# Patient Record
Sex: MALE | Race: BLACK OR AFRICAN AMERICAN | NOT HISPANIC OR LATINO | URBAN - METROPOLITAN AREA
[De-identification: names, ages, dates, MRNs, and addresses within clinical notes are randomized per-mention and may not be internally consistent; named-entity substitution may affect disease eponyms.]

---

## 2021-07-19 ENCOUNTER — OFFICE VISIT (OUTPATIENT)
Dept: PHYSICAL THERAPY | Facility: REHABILITATION | Age: 21
End: 2021-07-19
Payer: COMMERCIAL

## 2021-07-19 DIAGNOSIS — S76.312A HAMSTRING STRAIN, LEFT, INITIAL ENCOUNTER: Primary | ICD-10-CM

## 2021-07-19 PROCEDURE — 97161 PT EVAL LOW COMPLEX 20 MIN: CPT | Performed by: PHYSICAL THERAPIST

## 2021-07-19 NOTE — PROGRESS NOTES
PT Evaluation     Today's date: 2021  Patient name: Terrell Carr  : 2000  MRN: [id number]  Referring provider: No ref  provider found  Dx:   Encounter Diagnosis     ICD-10-CM    1  Hamstring strain, left, initial encounter  S76 312A                   Assessment  Assessment details: Pt is a pleasant 22 y o  male presenting to outpatient physical therapy Direct [facility] with Hamstring strain, left, initial encounter  (primary encounter diagnosis)  Pt presents with pain, decreased range of motion, decreased strength, and decreased tolerance to activity  Displays movement impairment diagnosis of L hamstring hypomobility dysfunction  Pt is a good candidate for outpatient physical therapy and would benefit from skilled physical therapy to address limitations and to achieve goals  Thank you for this referral    Impairments: abnormal coordination, abnormal or restricted ROM, activity intolerance, impaired physical strength and pain with function  Understanding of Dx/Px/POC: good   Prognosis: good    Goals  ST  Patient will report 25% decrease in pain in 4 weeks  - MET  2  Patient will demonstrate 25% improvement in ROM in 4 weeks  - MET  3  Patient will demonstrate 1/2 grade improvement in strength in 4 weeks  - MET    LT  Patient will be able to perform IADLS without restriction or pain by discharge  2  Patient will be independent in HEP by discharge  3  Patient will be able to return to recreational/work duties without restriction or pain by discharge        Plan  Patient would benefit from: PT eval and skilled PT  Planned modality interventions: cryotherapy and thermotherapy: hydrocollator packs  Planned therapy interventions: IADL retraining, body mechanics training, flexibility, functional ROM exercises, home exercise program, neuromuscular re-education, manual therapy, postural training, strengthening, stretching, therapeutic activities, therapeutic exercise and joint mobilization  Frequency: 2x week  Duration in visits: 8  Duration in weeks: 4  Treatment plan discussed with: patient        Subjective Evaluation    History of Present Illness  Mechanism of injury: 21  Pt comes to therapy reporting 2-week history of LLE injury, while at participating in some football drills  States he was in a full sprint while covering a wide  when he felt a pop in L hamstring region, resulting in immediate pain and difficulty with weight-bearing  Since this time, he states he has been applying ice and performing hamstring stretching, noting approximately 25% improvement  Pt denies low back, radiating symptoms, calf, knee, or contralateral symptoms  Denies paresthesias  LOC: prox L hamstring/musc-tendonous junction; described as pulling/soreness  AGGS: squat, eccentric hamstring movements  Pain  Current pain ratin    Patient Goals  Patient goals for therapy: decreased pain, increased motion and return to sport/leisure activities          Objective     Palpation     Additional Palpation Details  21  TTP in proximal 1/3 L hamstring, at musc/tendonous junction     Lumbar Screen  Lumbar range of motion within normal limits  Active Range of Motion     Right Hip   Normal active range of motion    Additional Active Range of Motion Details  21  Discomfort noted with prone active knee flex    Passive Range of Motion   Left Hip   Flexion: [place] [place]  External rotation (90/90): [place]  Internal rotation (90/90):  WFL    Right Hip   Normal passive range of motion    Strength/Myotome Testing     Left Hip   Planes of Motion   Flexion: 3-  Extension: 3+  Abduction: 4  External rotation: 4+  Internal rotation: 4+    Right Hip   Normal muscle strength    Tests     Additional Tests Details  21  SLR right - 82*, left - 76*  Squat - discomfort noted at initiation of squat  Deadlift - discomfort noted at initiation of deadlift, with less discomfort than squat  Lunge - no discomfort  Less discomfort noted with squat when manual pressure applied to hamstrings distal to area of soreness       Flowsheet Rows      Most Recent Value   PT/OT G-Codes   Current Score  52   Projected Score  81             Precautions: n/a    Daily Treatment Diary    Date 7/19            FOTO IE            Re-Eval IE               Manuals    MFD L hamstring DULCE MARIA            IASTM L hamstring DULCE MARIA            Active release nv                         Neuro Re-Ed                                                                                                Ther Ex    Squats             Leg press             RDLs                                                                              Ther Activity    bike                          Gait Training                              Modalities

## 2021-07-27 ENCOUNTER — OFFICE VISIT (OUTPATIENT)
Dept: PHYSICAL THERAPY | Facility: REHABILITATION | Age: 21
End: 2021-07-27
Payer: COMMERCIAL

## 2021-07-27 DIAGNOSIS — S76.312A HAMSTRING STRAIN, LEFT, INITIAL ENCOUNTER: Primary | ICD-10-CM

## 2021-07-27 PROCEDURE — 97140 MANUAL THERAPY 1/> REGIONS: CPT | Performed by: PHYSICAL THERAPIST

## 2021-07-27 PROCEDURE — 97530 THERAPEUTIC ACTIVITIES: CPT | Performed by: PHYSICAL THERAPIST

## 2021-07-27 PROCEDURE — 97110 THERAPEUTIC EXERCISES: CPT | Performed by: PHYSICAL THERAPIST

## 2021-07-27 NOTE — PROGRESS NOTES
Daily Note     Today's date: 2021  Patient name: Valentín Humphries  : 2000  MRN: [id number]  Referring provider: No ref  provider found  Dx:   Encounter Diagnosis     ICD-10-CM    1  Hamstring strain, left, initial encounter  S76 149A                   Subjective: Pt comes to therapy reporting decreased hamstring tightness and pain  States he found the chair hamstring stretches in standing to be most beneficial        Objective: See treatment diary below      Assessment: Tolerated treatment well  Demonstrated near full squat with minor discomfort reported at end of movement, however, able to demonstrate full squat with no reported symptoms post-manuals  Initiated eccentrically focussed program today with good tolerance and no reported pain throughout session  Also able to initiate agility exercises today with no concerns or discomfort  Finished with static chair hamstring stretches  Patient demonstrated fatigue post treatment, exhibited good technique with therapeutic exercises and would benefit from continued PT      Plan: Progress treatment as tolerated         Precautions: n/a    Daily Treatment Diary    Date            FOTO IE            Re-Eval IE               Manuals    MFD L hamstring DULCE MARIA العراقي c squat           IASTM L hamstring DULCE MARIA العراقي           Active release nv                         Neuro Re-Ed     Wall runner  pball   2x8           Bridge ball curl  nv           [place] HS curl  pball assist nv                                                               Ther Ex                 Leg press  198# x10  264# x10           RDLs  30#KB  2x10  double           Leg curl ecc  55# 2x10                        Bridge ecc slide on slider  Double x10  Single x10                                     Ther Activity    bike  5' L5           Agility ladder - fwd, lat, diag, hop in/out, side in/out  x2 ea                        Gait Training                              Modalities

## 2021-07-29 ENCOUNTER — OFFICE VISIT (OUTPATIENT)
Dept: PHYSICAL THERAPY | Facility: REHABILITATION | Age: 21
End: 2021-07-29
Payer: COMMERCIAL

## 2021-07-29 DIAGNOSIS — S76.312A HAMSTRING STRAIN, LEFT, INITIAL ENCOUNTER: Primary | ICD-10-CM

## 2021-07-29 PROCEDURE — 97140 MANUAL THERAPY 1/> REGIONS: CPT | Performed by: PHYSICAL THERAPIST

## 2021-07-29 PROCEDURE — 97530 THERAPEUTIC ACTIVITIES: CPT | Performed by: PHYSICAL THERAPIST

## 2021-07-29 PROCEDURE — 97110 THERAPEUTIC EXERCISES: CPT | Performed by: PHYSICAL THERAPIST

## 2021-07-29 NOTE — PROGRESS NOTES
Daily Note     Today's date: 2021  Patient name: Cate Madera  : 2000  MRN: [id number]  Referring provider: No ref  provider found  Dx:   Encounter Diagnosis     ICD-10-CM    1  Hamstring strain, left, initial encounter  S76 312A                   Subjective: Pt comes to therapy reporting soreness in hamstrings since last session, however, notes soreness is throughout hamstring region, not on lateral aspect as initially reported  Objective: See treatment diary below      Assessment: Tolerated treatment well  Patient demonstrated fatigue post treatment, exhibited good technique with therapeutic exercises and would benefit from continued PT  Demonstrated good control and awareness throughout session  Held slide board bridges, as these were attributed to high levels of soreness  Tolerated remainder of program well and without complaints  Challenged with knee curl machine eccentrics and nordic hamstring curls  Advised patient to perform gradual return to running program and sprinting at 50%, with gradual increase by 5-10% per day  Plan: Progress treatment as tolerated         Precautions: n/a    Daily Treatment Diary    Date           FOTO IE            Re-Eval IE               Manuals    MFD L hamstring DULCE MARIA DULCE MARIA c squat DULCE MARIA c active HS str          IASTM L hamstring DULCE MARIA DULCE MARIA DULCE MARIA          Active release nv                         Neuro Re-Ed     Wall runner  pball   2x8 pball   2x8          Bridge ball curl  nv           [place] HS curl  pball assist nv pball x5  s ball x8          Lunge c sliders - back, side, cross   x5 ea                                                 Ther Ex                 Leg press  198# x10  264# x10 242# x10  286# x10  308# 3x10          RDLs  30#KB  2x10  double 30#KB  2x10  double          Leg curl ecc  55# 2x10 55# 2x10                       Bridge ecc slide on slider  Double x10  Single x10 held                                    Ther Activity    bike 5' L5 5' L5          Agility ladder - fwd, lat, diag, hop in/out, side in/out  x2 ea x2 ea                       Gait Training                              Modalities

## 2021-08-02 ENCOUNTER — OFFICE VISIT (OUTPATIENT)
Dept: PHYSICAL THERAPY | Facility: REHABILITATION | Age: 21
End: 2021-08-02
Payer: COMMERCIAL

## 2021-08-02 DIAGNOSIS — S76.312A HAMSTRING STRAIN, LEFT, INITIAL ENCOUNTER: Primary | ICD-10-CM

## 2021-08-02 PROCEDURE — 97110 THERAPEUTIC EXERCISES: CPT | Performed by: PHYSICAL THERAPIST

## 2021-08-02 PROCEDURE — 97140 MANUAL THERAPY 1/> REGIONS: CPT | Performed by: PHYSICAL THERAPIST

## 2021-08-02 PROCEDURE — 97530 THERAPEUTIC ACTIVITIES: CPT | Performed by: PHYSICAL THERAPIST

## 2021-08-02 NOTE — PROGRESS NOTES
Daily Note     Today's date: 2021  Patient name: Lieutenant Fuentes  : 2000  MRN: [id number]  Referring provider: No ref  provider found  Dx:   Encounter Diagnosis     ICD-10-CM    1  Hamstring strain, left, initial encounter  S76 312A                   Subjective: Pt reports he is overall feeling better, did some light jogging and did have some mild mid-hamstring pain  Pt returns to campus next week for pre-season training  Objective: See treatment diary below  Pt arrived late to session, therefore, some exercises were held due to time constraints  Assessment: Pt with good tolerance to progression of program with appropriate challenge and fatigue, no increase in pain  Pt with most challenge with single leg RDLs and eccentric hamstring curls  Moderate myofascial restriction through central midportion of hamstrings, minimal tenderness  Will resume and progress exercises as able at next session  Pt will benefit from continued skilled PT intervention in order to address their remaining limitations and to restore maximal function  Plan: Progress treatment as tolerated         Precautions: n/a    Daily Treatment Diary    Date          FOTO IE            Re-Eval IE               Manuals    MFD L hamstring DULCE MARIA DULCE MARIA c squat DULCE MARIA c active HS str MD         IASTM L hamstring Mahnaz Morrison MD         Active release nv                         Neuro Re-Ed     Wall runner  pball   2x8 pball   2x8          Bridge ball curl  nv           [place] HS curl  pball assist nv pball x5  s ball x8          Lunge c sliders - back, side, cross   x5 ea                                                 Ther Ex                 Leg press  198# x10  264# x10 242# x10  286# x10  308# 3x10 242# x10  286# x10  308# 3x10         RDLs  30#KB  2x10  double 30#KB  2x10  double BLE  30#  1x10  LLE  15#  2x10         Leg curl ecc  55# 2x10 55# 2x10 55#  1x10  66#  2x10                      Bridge ecc slide on slider  Double x10  Single x10 held                                    Ther Activity    bike  5' L5 5' L5 L5x5'         Agility ladder - fwd, lat, diag, hop in/out, side in/out  x2 ea x2 ea                       Gait Training                              Modalities

## 2021-08-04 ENCOUNTER — OFFICE VISIT (OUTPATIENT)
Dept: PHYSICAL THERAPY | Facility: REHABILITATION | Age: 21
End: 2021-08-04
Payer: COMMERCIAL

## 2021-08-04 DIAGNOSIS — S76.312A HAMSTRING STRAIN, LEFT, INITIAL ENCOUNTER: Primary | ICD-10-CM

## 2021-08-04 PROCEDURE — 97140 MANUAL THERAPY 1/> REGIONS: CPT | Performed by: PHYSICAL THERAPIST

## 2021-08-04 PROCEDURE — 97110 THERAPEUTIC EXERCISES: CPT | Performed by: PHYSICAL THERAPIST

## 2021-08-04 PROCEDURE — 97530 THERAPEUTIC ACTIVITIES: CPT | Performed by: PHYSICAL THERAPIST

## 2021-08-04 NOTE — PROGRESS NOTES
Daily Note     Today's date: 2021  Patient name: Christiano Ortiz  : 2000  MRN: [id number]  Referring provider: Shandra Yoon, PT  Dx:   Encounter Diagnosis     ICD-10-CM    1  Hamstring strain, left, initial encounter  S76 378A                   Subjective: Pt reports he is overall feeling better, did some foot work drill and jogging and felt really good without any pain  Pt returns to campus Monday for pre-season training  Objective: See treatment diary below  Moderate myofascial restriction through central midportion of hamstrings, minimal tenderness  Will  progress exercises as able at next session  Pt will benefit from continued skilled PT intervention in order to address their remaining limitations and to restore maximal function  Plan: Progress treatment as tolerated         Precautions: n/a    Daily Treatment Diary    Date         FOTO IE            Re-Eval IE               Manuals    MFD L hamstring DULCE MARIA DULCE MARIA c squat DULCE MARIA c active HS str MD MICHAELS        IASTM L hamstring Amor Lancaster MD, MD        Active release nv                         Neuro Re-Ed     Wall runner  pball   2x8 pball   2x8          Bridge ball curl  nv           [place] HS curl  pball assist nv pball x5  s ball x8  Pball  3x10        Lunge c sliders - back, side, cross   x5 ea  3 way  x10 ea                                               Ther Ex                 Leg press  198# x10  264# x10 242# x10  286# x10  308# 3x10 242# x10  286# x10  308# 3x10 286# x10  308# 3x10        RDLs  30#KB  2x10  double 30#KB  2x10  double BLE  30#  1x10  LLE  15#  2x10 BLE  30#  1x10  LLE  30#  2x10        Leg curl ecc  55# 2x10 55# 2x10 55#  1x10  66#  2x10 66#  3x10                     Bridge ecc slide on slider  Double x10  Single x10 held                                    Ther Activity    bike  5' L5 5' L5 L5x5'         Agility ladder - fwd, lat, diag, hop in/out, side in/out  x2 ea x2 ea          Elliptical     L5x5' Gait Training                              Modalities

## 2021-08-06 ENCOUNTER — OFFICE VISIT (OUTPATIENT)
Dept: PHYSICAL THERAPY | Facility: REHABILITATION | Age: 21
End: 2021-08-06
Payer: COMMERCIAL

## 2021-08-06 DIAGNOSIS — S76.312A HAMSTRING STRAIN, LEFT, INITIAL ENCOUNTER: Primary | ICD-10-CM

## 2021-08-06 PROCEDURE — 97112 NEUROMUSCULAR REEDUCATION: CPT | Performed by: PHYSICAL THERAPIST

## 2021-08-06 PROCEDURE — 97110 THERAPEUTIC EXERCISES: CPT | Performed by: PHYSICAL THERAPIST

## 2021-08-06 PROCEDURE — 97140 MANUAL THERAPY 1/> REGIONS: CPT | Performed by: PHYSICAL THERAPIST

## 2021-08-06 NOTE — PROGRESS NOTES
Daily Note     Today's date: 2021  Patient name: Ruddy Olivo  : 2000  MRN: [id number]  Referring provider: No ref  provider found  Dx:   Encounter Diagnosis     ICD-10-CM    1  Hamstring strain, left, initial encounter  S76 312A                   Subjective: Pt reports he had some left lateral hip soreness after doing some drills and jogging yesterday  Objective: See treatment diary below  Assessment: Pt with good response to addition of dynamic stretching and hip flexor stretching  Held hamstring strengthening exercises today due to continued muscle soreness following his workout yesterday  Pt continues with moderate myofascial restriction through proximal central midportion of hamstrings with minimal to moderate tenderness  Pt will be transitioning care to athletic training staff when he returns to campus on Monday  Plan: Discharge PT  Pt will transition care to athletic training staff upon return to campus on 21       Precautions: n/a    Daily Treatment Diary    Date         FOTO IE            Re-Eval IE               Manuals    MFD L hamstring DULCE MARIA DULCE MARIA c squat DULCE MARIA c active HS str MD MICHAELS        IASTM L hamstring Danette Moe MD, MD        Active release nv                         Neuro Re-Ed     Wall runner  pball   2x8 pball   2x8          Bridge ball curl  nv           [place] HS curl  pball assist nv pball x5  s ball x8  Pball  3x10        Lunge c sliders - back, side, cross   x5 ea  3 way  x10 ea                                               Ther Ex                 Leg press  198# x10  264# x10 242# x10  286# x10  308# 3x10 242# x10  286# x10  308# 3x10 286# x10  308# 3x10        RDLs  30#KB  2x10  double 30#KB  2x10  double BLE  30#  1x10  LLE  15#  2x10 BLE  30#  1x10  LLE  30#  2x10        Leg curl ecc  55# 2x10 55# 2x10 55#  1x10  66#  2x10 66#  3x10                     Bridge ecc slide on slider  Double x10  Single x10 held Ther Activity    bike  5' L5 5' L5 L5x5'         Agility ladder - fwd, lat, diag, hop in/out, side in/out  x2 ea x2 ea          Elliptical     L5x5'        Gait Training                              Modalities

## 2022-12-15 ENCOUNTER — OFFICE VISIT (OUTPATIENT)
Dept: PHYSICAL THERAPY | Facility: REHABILITATION | Age: 22
End: 2022-12-15

## 2022-12-15 DIAGNOSIS — S76.012A STRAIN OF LEFT HIP ADDUCTOR MUSCLE, INITIAL ENCOUNTER: Primary | ICD-10-CM

## 2022-12-15 NOTE — PROGRESS NOTES
PT Evaluation     Today's date: 12/15/2022  Patient name: Komal Reis  : 2000  MRN: [id number]  Referring provider: Gabino Roberts PT  Dx:   Encounter Diagnosis     ICD-10-CM    1  Strain of left hip adductor muscle, initial encounter  S76 012A                      Assessment  Assessment details: Pt is a pleasant 25 y o  male presenting to outpatient physical therapy with left hip adductor strain  Pt presents with pain, decreased range of motion, decreased strength, and decreased tolerance to activity  Pt is a good candidate for outpatient physical therapy and would benefit from skilled physical therapy to address limitations and to achieve goals  Thank you for this referral    Impairments: abnormal coordination, abnormal or restricted ROM, activity intolerance, impaired physical strength and pain with function  Understanding of Dx/Px/POC: good   Prognosis: good    Goals  ST  Patient will report 50% decrease in pain with agility drills in 4 weeks  2  Patient will report ability to return to playing a full game in 4 weeks  3  Patient will demonstrate 1/2 grade improvement in strength in 4 weeks  LT  Patient will be able to perform IADLS without restriction or pain by discharge  2  Patient will be independent in HEP by discharge  3  Patient will be able to return to recreational/work duties without restriction or pain by discharge        Plan  Patient would benefit from: PT eval and skilled PT  Planned modality interventions: cryotherapy and thermotherapy: hydrocollator packs  Planned therapy interventions: IADL retraining, body mechanics training, flexibility, functional ROM exercises, home exercise program, neuromuscular re-education, manual therapy, postural training, strengthening, stretching, therapeutic activities, therapeutic exercise and joint mobilization  Frequency: 3x week  Duration in visits: 20  Duration in weeks: 12  Treatment plan discussed with: patient        Subjective Evaluation    History of Present Illness  Mechanism of injury: 12/15/22  Pt comes to therapy reporting approx 1+ month ago he sustained an injury to his left adductors, noting MRI confirmed complete tear of the muscle  Patient is a collegiate football player, 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  Reports he then restrained the area playing a game a few weeks later  States he had been training with the staff at school, however, is now at home on break  States he had been doing mobility, strengthening, and agility exercises  Reports he has not discomfort performing ADLS, however, still limited playing football  States that in one month he is scheduled to go to [facility] to play in an [employer] game  Therefore, his goals are to be able to play in this game     Pain  Current pain ratin  At worst pain ratin    Patient Goals  Patient goals for therapy: decreased pain, increased motion, increased strength and return to sport/leisure activities          Objective     Passive Range of Motion   Left Hip   Abduction: with pain    Additional Passive Range of Motion Details  12/15/22  Discomfort the hip adductors with passive left hip abduction     Strength/Myotome Testing     Left Hip   Planes of Motion   Flexion: 4  Extension: 4  Abduction: 4-  Adduction: 3-  External rotation: 3+  Internal rotation: 4-    Left Knee   Flexion: 4+  Extension: 4+             Precautions: n/a    Daily Treatment Diary    Date 12/15            FOTO IE            Re-Eval IE               Manuals    MFD left add DULCE MARIA            IASTM left add DULCE MARIA            L hip mobs - lat                          Neuro Re-Ed                                                                                                Ther Ex    Blaze pods shuffle             Agility ladder - fwd, lat, diag             Wall hip abd iso              Lunge sliders - fwd, back, lat             SL sit to stand                          Eccentric bridge slide             Hip add iso bridge 2x10            [place]'s              Hip add stretch 10"x10                         Ther Activity    Elliptical                           Gait Training                              Modalities

## 2022-12-19 ENCOUNTER — OFFICE VISIT (OUTPATIENT)
Dept: PHYSICAL THERAPY | Facility: REHABILITATION | Age: 22
End: 2022-12-19

## 2022-12-19 DIAGNOSIS — S76.012A STRAIN OF LEFT HIP ADDUCTOR MUSCLE, INITIAL ENCOUNTER: Primary | ICD-10-CM

## 2022-12-19 NOTE — PROGRESS NOTES
Daily Note     Today's date: 2022  Patient name: Yoon Nogueira  : 2000  MRN: [id number]  Referring provider: Liz Bermeo, PT  Dx:   Encounter Diagnosis     ICD-10-CM    1  Strain of left hip adductor muscle, initial encounter  S76 012A                      Subjective: Pt comes to therapy denying pain or discomfort  Denies discomfort following last treatment session  Objective: See treatment diary below      Assessment: Tolerated treatment well  Soreness and areas of restrictions palpable in L adductors with MFD and IASTM  Challenged with eccentric bridges and lunge slides  Patient demonstrated fatigue post treatment, exhibited good technique with therapeutic exercises and would benefit from continued PT      Plan: Progress treatment as tolerated         Precautions: n/a    Daily Treatment Diary    Date 12/15 12/19           FOTO IE            Re-Eval IE               Manuals    MFD left add Ant Necessary           IASTM left add DULCE MARIA DULCE MARIA           L hip mobs - lat                          Neuro Re-Ed                                                                                                Ther Ex    Blaze pods shuffle  45"x2 (12, 14)           Agility ladder - fwd, lat, diag  nv           Wall hip abd iso   5"x10 b/l           Lunge sliders - fwd, back, lat  x10 ea b/l           SL sit to stand  nv                        Eccentric bridge slide  1x10 DL  1x10 SL           Hip add iso bridge 2x10            [place]'s   3x10 R stab           Hip add stretch 10"x10 20"x5                        Ther Activity    Elliptical   10 min                        Gait Training                              Modalities

## 2022-12-21 ENCOUNTER — OFFICE VISIT (OUTPATIENT)
Dept: PHYSICAL THERAPY | Facility: REHABILITATION | Age: 22
End: 2022-12-21

## 2022-12-21 DIAGNOSIS — S76.012A STRAIN OF LEFT HIP ADDUCTOR MUSCLE, INITIAL ENCOUNTER: Primary | ICD-10-CM

## 2022-12-21 NOTE — PROGRESS NOTES
Daily Note     Today's date: 2022  Patient name: Marivel York  : 2000  MRN: [id number]  Referring provider: Franklin Infante, PT  Dx:   Encounter Diagnosis     ICD-10-CM    1  Strain of left hip adductor muscle, initial encounter  S76 012A                      Subjective: Pt comes to therapy denying adverse reactions following last therapy session, noting some soreness yesterday  Notes he can feel a spot along the proximal adductors that is tight  Objective: See treatment diary below      Assessment: Tolerated treatment well  Soreness noted with MFD to proximal adductor  Patient demonstrated fatigue post treatment, exhibited good technique with therapeutic exercises and would benefit from continued PT      Plan: Progress treatment as tolerated         Precautions: n/a    Daily Treatment Diary    Date 12/15 12/19 12/21          FOTO IE            Re-Eval IE               Manuals    MFD left add DULCE MARIA DULCE MARIA DULCE MARIA          IASTM left add DULCE MARIA DULCE MARIA DULCE MARIA          L hip mobs - lat                          Neuro Re-Ed                                                                                                Ther Ex    Blaze pods shuffle  45"x2 (12, 14) 45"x2 (14, 12)          Agility ladder - fwd, lat, diag  nv           Wall hip abd iso   5"x10 b/l 5" 2x10 b/l          Lunge sliders - fwd, back, lat  x10 ea b/l x10 ea b/l          SL sit to stand  nv                        Eccentric bridge slide  1x10 DL  1x10 SL 2x10 SL          Hip add iso bridge 2x10            [place]'s   3x10 R stab 3x10 R stab          Hip add stretch 10"x10 20"x5 PRN                       Ther Activity    Elliptical   10 min 10 min L8                       Gait Training                              Modalities

## 2022-12-23 ENCOUNTER — OFFICE VISIT (OUTPATIENT)
Dept: PHYSICAL THERAPY | Facility: REHABILITATION | Age: 22
End: 2022-12-23

## 2022-12-23 DIAGNOSIS — S76.012A STRAIN OF LEFT HIP ADDUCTOR MUSCLE, INITIAL ENCOUNTER: Primary | ICD-10-CM

## 2022-12-23 NOTE — PROGRESS NOTES
Daily Note     Today's date: 2022  Patient name: Jeni Ratliff  : 2000  MRN: [id number]  Referring provider: Karlee Smith, PT  Dx:   Encounter Diagnosis     ICD-10-CM    1  Strain of left hip adductor muscle, initial encounter  S76 012A                      Subjective: Pt reports he had less soreness in L adductors following last session compared to prior session  Objective: See treatment diary below      Assessment: Tolerated treatment well  Good challenge reported with program  Patient demonstrated fatigue post treatment, exhibited good technique with therapeutic exercises and would benefit from continued PT      Plan: Progress treatment as tolerated         Precautions: n/a    Daily Treatment Diary    Date 12/15 12/19 12/21 12/23         FOTO IE            Re-Eval IE               Manuals    MFD left add Sueanne Gift DULCE MARIA DULCE MARIA         IASTM left add DULCE MARIA DULCE MARIA DULCE MARIA DULCE MARIA         L hip mobs - lat                          Neuro Re-Ed     Plank sliders - hip abd c TB for add    Purple 2x10                                                                                       Ther Ex    Blaze pods shuffle  45"x2 (12, 14) 45"x2 (14, 12) 45"x2 (14, 14)         Agility ladder - fwd, lat, diag  nv           Wall hip abd iso   5"x10 b/l 5" 2x10 b/l Runner nv         Lunge sliders - fwd, back, lat  x10 ea b/l x10 ea b/l x10 ea b/l                      Eccentric bridge slide  1x10 DL  1x10 SL 2x10 SL 2x10 SL c add iso         Bridges c hip add iso c LE ext    nv         Hip add iso bridge 2x10            [place]'s   3x10 R stab, L add 3x10 R stab, L add 2x10 ea R stab, L add, flex         Plank hip abd sliders c TB add    Purple 2x10         Hip add stretch 10"x10 20"x5 PRN          Leg press c add    nv                                   Ther Activity    Elliptical   10 min 10 min L8 10 min L8                      Gait Training                              Modalities

## 2022-12-27 ENCOUNTER — APPOINTMENT (OUTPATIENT)
Dept: PHYSICAL THERAPY | Facility: REHABILITATION | Age: 22
End: 2022-12-27

## 2022-12-29 ENCOUNTER — APPOINTMENT (OUTPATIENT)
Dept: PHYSICAL THERAPY | Facility: REHABILITATION | Age: 22
End: 2022-12-29

## 2022-12-30 ENCOUNTER — OFFICE VISIT (OUTPATIENT)
Dept: PHYSICAL THERAPY | Facility: REHABILITATION | Age: 22
End: 2022-12-30

## 2022-12-30 DIAGNOSIS — S76.012A STRAIN OF LEFT HIP ADDUCTOR MUSCLE, INITIAL ENCOUNTER: Primary | ICD-10-CM

## 2022-12-30 NOTE — PROGRESS NOTES
Daily Note     Today's date: 2022  Patient name: Yoon Nogueira  : 2000  MRN: [id number]  Referring provider: Liz Bermeo, PT  Dx:   Encounter Diagnosis     ICD-10-CM    1  Strain of left hip adductor muscle, initial encounter  S76 012A                      Subjective: pt reports with c/o pain in groin region due to slipping while performing ladder drills two days  He currently denied pain prior to beginning today  He noted stretching sensation only with stepping forward and to the right  Objective: See treatment diary below      Assessment: Tolerated treatment well and without complaints  Good response with manuals and exercises with no adverse reactions  He noted slight pulling/discomfort in intermittent groin pain with exercises, but diminished upon completion  Patient demonstrated fatigue post treatment, exhibited good technique with therapeutic exercises and would benefit from continued PT      Plan: Continue per plan of care  Progress treatment as tolerated         Precautions: n/a    Daily Treatment Diary    Date 12/15 12/19 12/21 12/23 12/30        FOTO IE    nv        Re-Eval IE               Manuals    MFD left add DULCE MARIA DULCE MARIA DULCE MARIA DULCE MARIA TE        IASTM left add DULCE MARIA DULCE MARIA DULCE MARIA DULCE MARIA TE        L hip mobs - lat                          Neuro Re-Ed     Plank sliders - hip abd c TB for add    Purple 2x10 Purple 2x10                                                                                      Ther Ex    Blaze pods shuffle  45"x2 (12, 14) 45"x2 (14, 12) 45"x2 (14, 14) 45"x2  (14, 17)          Agility ladder - fwd, lat, diag  nv           Wall hip abd iso   5"x10 b/l 5" 2x10 b/l Runner nv nv        Lunge sliders - fwd, back, lat  x10 ea b/l x10 ea b/l x10 ea b/l x10 ea b/l                     Eccentric bridge slide  1x10 DL  1x10 SL 2x10 SL 2x10 SL c add iso 2x10 SL c add iso        Bridges c hip add iso c LE ext    nv         Hip add iso bridge 2x10            [place]'s   3x10 R stab, L add 3x10 R stab, L add 2x10 ea R stab, L add, flex 2x10 ea R stab, L add, flex        Plank hip abd sliders c TB add    Purple 2x10 Purple 2x10        Hip add stretch 10"x10 20"x5 PRN          Leg press c add    nv 154# 3x10                                  Ther Activity    Elliptical   10 min 10 min L8 10 min L8 L8 x10 min L7 x8'                    Gait Training                              Modalities                                           ,

## 2023-01-03 ENCOUNTER — OFFICE VISIT (OUTPATIENT)
Dept: PHYSICAL THERAPY | Facility: REHABILITATION | Age: 23
End: 2023-01-03

## 2023-01-03 DIAGNOSIS — S76.012A STRAIN OF LEFT HIP ADDUCTOR MUSCLE, INITIAL ENCOUNTER: Primary | ICD-10-CM

## 2023-01-03 NOTE — PROGRESS NOTES
Daily Note     Today's date: 1/3/2023  Patient name: Carley Epperson  : 2000  MRN: [id number]  Referring provider: Rommel King, PT  Dx:   Encounter Diagnosis     ICD-10-CM    1  Strain of left hip adductor muscle, initial encounter  S76 012A                      Subjective: Pt comes to therapy denying pain or discomfort  Denies discomfort following last treatment session  Objective: See treatment diary below      Assessment: Tolerated treatment well  Patient demonstrated fatigue post treatment, exhibited good technique with therapeutic exercises and would benefit from continued PT      Plan: Progress treatment as tolerated         Precautions: n/a    Daily Treatment Diary    Date 12/15 12/19 12/21 12/23 12/30 1/3       FOTO IE    nv        Re-Eval IE               Manuals    MFD left add Sherron Sell TE        IASTM left add Sherron Sell TE        L hip mobs - lat                          Neuro Re-Ed                                                                                   Ther Ex    Blaze pods shuffle  45"x2 (12, 14) 45"x2 (14, 12) 45"x2 (14, 14) 45"x2  (14, 17)   45"x2  (14, 13)       Agility ladder - fwd, lat, diag  nv           Wall hip abd iso   5"x10 b/l 5" 2x10 b/l Runner nv nv nv       Lunge sliders - fwd, back, lat  x10 ea b/l x10 ea b/l x10 ea b/l x10 ea b/l x10 ea b/l slide board       Eccentric bridge slide  1x10 DL  1x10 SL 2x10 SL 2x10 SL c add iso 2x10 SL c add iso 2x10 SL c add iso slide board       Bridges c hip add iso c LE ext    nv         Hip add iso bridge 2x10            [place]'s   3x10 R stab, L add 3x10 R stab, L add 2x10 ea R stab, L add, flex 2x10 ea R stab, L add, flex 2x10 ea R stab, L add, flex       Plank hip abd sliders c TB add    Purple 2x10 Purple 2x10 Purple 2x10 slide board       Hip add stretch 10"x10 20"x5 PRN          Leg press c add    nv 154# 3x10 176# 3x10       Slide board - lateral slides      60" x2                    Ther Activity Elliptical   10 min 10 min L8 10 min L8 L8 x10 min Bike x10'                    Gait Training                              Modalities

## 2023-01-04 ENCOUNTER — OFFICE VISIT (OUTPATIENT)
Dept: PHYSICAL THERAPY | Facility: REHABILITATION | Age: 23
End: 2023-01-04

## 2023-01-04 DIAGNOSIS — S76.012A STRAIN OF LEFT HIP ADDUCTOR MUSCLE, INITIAL ENCOUNTER: Primary | ICD-10-CM

## 2023-01-04 NOTE — PROGRESS NOTES
Daily Note     Today's date: 2023  Patient name: Sylvain Walter  : 2000  MRN: [id number]  Referring provider: Gerardo Jasso, PT  Dx:   Encounter Diagnosis     ICD-10-CM    1  Strain of left hip adductor muscle, initial encounter  S76 012A                      Subjective: Pt comes to therapy reporting mild DOMS in glutes and adductors  Otherwise, denies notable adverse effects from prior session  Objective: See treatment diary below      Assessment: Tolerated treatment well  Challenged with slide and newly added Rahel hip adduction  Able to perform [place] for 2+ second hold on left prior to onset of pain, which is an improvement from not being able to tolerate at all previously  Improved single leg squat and greater ease reported on LLE with lunge sliders  Continued soreness noted with MFD to proximal left hip adductors  Patient demonstrated fatigue post treatment, exhibited good technique with therapeutic exercises and would benefit from continued PT      Plan: Progress treatment as tolerated         Precautions: n/a    Daily Treatment Diary    Date 12/15 12/19 12/21 12/23 12/30 1/3 1/4      FOTO IE            Re-Eval IE               Manuals    MFD left add [facility]      IASTM left add Elaina Hams DULCE MARIA DULCE MARIA TE DULCE MARIA DULCE MARIA      L hip mobs - lat                          Neuro Re-Ed                                                                                   Ther Ex    Blaze pods shuffle  45"x2 (12, 14) 45"x2 (14, 12) 45"x2 (14, 14) 45"x2  (14, 17)   45"x2  (14, 13) nv (first)      Agility ladder - fwd, lat, diag  nv           Wall hip abd iso   5"x10 b/l 5" 2x10 b/l Runner nv nv nv       Lunge sliders - fwd, back, lat  x10 ea b/l x10 ea b/l x10 ea b/l x10 ea b/l x10 ea b/l slide board x10 ea b/l slide board      Eccentric bridge slide  1x10 DL  1x10 SL 2x10 SL 2x10 SL c add iso 2x10 SL c add iso 2x10 SL c add iso slide board np      Bridges c hip add iso c LE ext    nv         Hip add iso bridge 2x10            [place]'s   3x10 R stab, L add 3x10 R stab, L add 2x10 ea R stab, L add, flex 2x10 ea R stab, L add, flex 2x10 ea R stab, L add, flex 2x10 ea R stab, L add, flex      Plank hip abd sliders c TB add    Purple 2x10 Purple 2x10 Purple 2x10 slide board Purple 2x10 slide board      Hip add stretch 10"x10 20"x5 PRN          Leg press c add    nv 154# 3x10 176# 3x10 220# 3x10      Slide board - lateral slides      60" x2 60" x2      Rahel standing hip adduction       10# 2x10                   Ther Activity    Elliptical   10 min 10 min L8 10 min L8 L8 x10 min Bike x10' Bike x10'                   Gait Training                              Modalities

## 2023-01-06 ENCOUNTER — OFFICE VISIT (OUTPATIENT)
Dept: PHYSICAL THERAPY | Facility: REHABILITATION | Age: 23
End: 2023-01-06

## 2023-01-06 DIAGNOSIS — S76.012A STRAIN OF LEFT HIP ADDUCTOR MUSCLE, INITIAL ENCOUNTER: Primary | ICD-10-CM

## 2023-01-06 NOTE — PROGRESS NOTES
Daily Note     Today's date: 2023  Patient name: Stephanie Adame  : 2000  MRN: [id number]  Referring provider: Pierre Angelo, PT  Dx:   Encounter Diagnosis     ICD-10-CM    1  Strain of left hip adductor muscle, initial encounter  S76 012A                      Subjective: Pt comes to therapy denying pain or discomfort  Denies discomfort following last treatment session  Objective: See treatment diary below      Assessment: Tolerated treatment well  Less discomfort with slides  Improved tolerance for [place]'s on LLE  Continued soreness and discomfort in area of proximal adductors with MFD  Patient demonstrated fatigue post treatment, exhibited good technique with therapeutic exercises and would benefit from continued PT      Plan: Progress treatment as tolerated         Precautions: n/a    Daily Treatment Diary    Date 12/15 12/19 12/21 12/23 12/30 1/3 1/4 1/6     FOTO IE            Re-Eval IE               Manuals    MFD left add [address]     IASTM left add [place] DULCE MARIA DULCE MARIA TE DULCE MARIA DULCE MARIA DULCE MARIA     L hip mobs - lat                          Neuro Re-Ed                                                                                   Ther Ex    Blaze pods shuffle  45"x2 (12, 14) 45"x2 (14, 12) 45"x2 (14, 14) 45"x2  (14, 17)   45"x2  (14, 13) nv (first) 45"x2  (14, 13)     Agility ladder - fwd, lat, diag  nv           Wall hip abd iso   5"x10 b/l 5" 2x10 b/l Runner nv nv nv       Lunge sliders - fwd, back, lat  x10 ea b/l x10 ea b/l x10 ea b/l x10 ea b/l x10 ea b/l slide board x10 ea b/l slide board x10 ea b/l slide board       Eccentric bridge slide  1x10 DL  1x10 SL 2x10 SL 2x10 SL c add iso 2x10 SL c add iso 2x10 SL c add iso slide board np      Bridges c hip add iso c LE ext    nv         Hip add iso bridge 2x10            [place]'s   3x10 R stab, L add 3x10 R stab, L add 2x10 ea R stab, L add, flex 2x10 ea R stab, L add, flex 2x10 ea R stab, L add, flex 2x10 ea R stab, L add, flex 2x10 ea b/l     Plank hip abd sliders c TB add    Purple 2x10 Purple 2x10 Purple 2x10 slide board Purple 2x10 slide board Purple 2x10 slide board     Hip add stretch 10"x10 20"x5 PRN          Leg press c add    nv 154# 3x10 176# 3x10 220# 3x10 220# 3x10     Leg extension - 2 up, 1 down       66# 3x10 66# 3x10     Slide board - lateral slides      60" x2 60" x2 60" x2     [place] standing hip adduction       10# 2x10 10# 2x10                  Ther Activity    Elliptical   10 min 10 min L8 10 min L8 L8 x10 min Bike x10' Bike x10' Bike x10'                  Gait Training                              Modalities

## 2023-01-09 ENCOUNTER — OFFICE VISIT (OUTPATIENT)
Dept: PHYSICAL THERAPY | Facility: REHABILITATION | Age: 23
End: 2023-01-09

## 2023-01-09 DIAGNOSIS — S76.012A STRAIN OF LEFT HIP ADDUCTOR MUSCLE, INITIAL ENCOUNTER: Primary | ICD-10-CM

## 2023-01-09 NOTE — PROGRESS NOTES
Daily Note     Today's date: 2023  Patient name: Anitha Huang  : 2000  MRN: [id number]  Referring provider: Valentina Dinh, PT  Dx:   Encounter Diagnosis     ICD-10-CM    1  Strain of left hip adductor muscle, initial encounter  S76 012A                      Subjective: Reports he had less post-exercise soreness when performing MFD/IASTM at end of session last time  Denies adverse effects from last session  Objective: See treatment diary below      Assessment: Tolerated treatment well  Challenged with new hip add weighted sliders and good challenge noted with weighted slide board activities  Patient demonstrated fatigue post treatment, exhibited good technique with therapeutic exercises and would benefit from continued PT      Plan: Potential discharge next visit  Progress treatment as tolerated         Precautions: n/a    Daily Treatment Diary    Date 12/15 12/19 12/21 12/23 12/30 1/3 1/4 1/6 1/9    FOTO IE            Re-Eval IE               Manuals    MFD left add [address]    IASTM left add [id number] TE DULCE MARIA DULCE MARIA DULCE MARIA DULCE MARIA    L hip mobs - lat                          Neuro Re-Ed                                                                                   Ther Ex    Blaze pods shuffle  45"x2 (12, 14) 45"x2 (14, 12) 45"x2 (14, 14) 45"x2  (14, 17)   45"x2  (14, 13) nv (first) 45"x2  (14, 13) 45"x2 (14, 14)    Agility ladder - fwd, lat, diag  nv           Wall hip abd iso   5"x10 b/l 5" 2x10 b/l Runner nv nv nv       Lunge sliders - fwd, back, lat  x10 ea b/l x10 ea b/l x10 ea b/l x10 ea b/l x10 ea b/l slide board x10 ea b/l slide board x10 ea b/l slide board   nv    Eccentric bridge slide  1x10 DL  1x10 SL 2x10 SL 2x10 SL c add iso 2x10 SL c add iso 2x10 SL c add iso slide board np      Bridges c hip add iso c LE ext    nv         Hip add iso bridge 2x10            [place]'s   3x10 R stab, L add 3x10 R stab, L add 2x10 ea R stab, L add, flex 2x10 ea R stab, L add, flex 2x10 ea R stab, L add, flex 2x10 ea R stab, L add, flex 2x10 ea b/l nv    Plank hip abd sliders c TB add    Purple 2x10 Purple 2x10 Purple 2x10 slide board Purple 2x10 slide board Purple 2x10 slide board nv    Hip add stretch 10"x10 20"x5 PRN          Leg press c add    nv 154# 3x10 176# 3x10 220# 3x10 220# 3x10 220# x10  242# 2x10    Leg extension - 2 up, 1 down       66# 3x10 66# 3x10 66# 3x10    Slide board - lateral slides      60" x2 60" x2 60" x2 60" c 5# x2    [place] standing hip adduction       10# 2x10 10# 2x10 10# 2x10    Hip add sliders         15#DB 2x10                 Ther Activity    Elliptical   10 min 10 min L8 10 min L8 L8 x10 min Bike x10' Bike x10' Bike x10' Bike x10'                 Gait Training                              Modalities

## 2023-01-11 ENCOUNTER — APPOINTMENT (OUTPATIENT)
Dept: PHYSICAL THERAPY | Facility: REHABILITATION | Age: 23
End: 2023-01-11